# Patient Record
Sex: FEMALE | Race: WHITE | NOT HISPANIC OR LATINO | Employment: FULL TIME | URBAN - METROPOLITAN AREA
[De-identification: names, ages, dates, MRNs, and addresses within clinical notes are randomized per-mention and may not be internally consistent; named-entity substitution may affect disease eponyms.]

---

## 2019-03-12 ENCOUNTER — TELEPHONE (OUTPATIENT)
Dept: ONCOLOGY | Facility: CLINIC | Age: 59
End: 2019-03-12

## 2019-03-20 ENCOUNTER — HOSPITAL ENCOUNTER (OUTPATIENT)
Facility: CLINIC | Age: 59
Setting detail: SPECIMEN
End: 2019-03-20
Attending: COLON & RECTAL SURGERY
Payer: COMMERCIAL

## 2019-03-20 ENCOUNTER — HOSPITAL ENCOUNTER (OUTPATIENT)
Dept: LAB | Facility: CLINIC | Age: 59
End: 2019-03-20
Attending: COLON & RECTAL SURGERY
Payer: COMMERCIAL

## 2019-03-20 ENCOUNTER — ONCOLOGY VISIT (OUTPATIENT)
Dept: ONCOLOGY | Facility: CLINIC | Age: 59
End: 2019-03-20
Attending: COLON & RECTAL SURGERY
Payer: COMMERCIAL

## 2019-03-20 DIAGNOSIS — Z80.3 FAMILY HISTORY OF MALIGNANT NEOPLASM OF BREAST: Primary | ICD-10-CM

## 2019-03-20 DIAGNOSIS — Z80.41 FAMILY HISTORY OF MALIGNANT NEOPLASM OF OVARY: ICD-10-CM

## 2019-03-20 DIAGNOSIS — Z84.81 FAMILY HISTORY OF GENETIC DISEASE CARRIER: ICD-10-CM

## 2019-03-20 PROCEDURE — 40000072 ZZH STATISTIC GENETIC COUNSELING, < 16 MIN: Performed by: GENETIC COUNSELOR, MS

## 2019-03-20 NOTE — LETTER
Cancer Risk Management  Program Locations    [facility]  [facility]  [facility]  [facility]  Mailing Address  Cancer Risk Management Program  [facility]  [address] [facility] [address]  [place] [address]    New patient appointments  [phone]  March 20, 2019    Mari Rodriguez  [address] [address]      Dear Mari,    It was a pleasure meeting with you at the [facility] on 03/20/2019.  Here is a copy of the progress note from your recent genetic counseling visit to the Cancer Risk Management Program.  If you have any additional questions, please feel free to call.    Referring Provider: Self    Presenting Information:   I met with Mari Rodriguez today for genetic counseling at the Cancer Risk Management Program at the [facility] to discuss her sister's genetic testing results that were positive for a mutation in her MUTYH gene as well as her family history of breast, ovarian cancer. She is here today to review this history, cancer screening recommendations, and available genetic testing options.    Personal History:  Mari is a 58 year old female. She  does not have any personal history of cancer. Mari reports that her medical is significant for 3-4 ovarian cysts, one ectopic pregnancy, four miscarriages, and she was recently diagnosed with a hiatal and 3 stomach ulcers. She also reports that she was found to be a carrier of Factor V Leiden.        She had her first menstrual period at age 18, her first child at age 29, and started menopause around the age of 37.  Mari has her left ovary, left fallopian tube and uterus in place. She had her right ovary and fallopian tube removed due to her ectopic pregnancy. She gets annual  blood tests due to her mother's history of ovarian cancer.  She  reports a 1-2-year history of oral contraceptive use ages ~[age] and that she was on estrogen replacement therapy for a couple of years after she started menopause.     Her most recent OB-GYN exam and Pap smear in August of 2018 were normal.  She has annual clinical breast exams and mammograms. Her most recent mammogram in August of 2018 was normal. She has had 2 breast biopsies; one in her 40's and one in her early 50's.  She began having colonoscopies before the age of 52 due to GI issues. She has had 2-3 colonoscopies total. Mari reports that she has never had any polyps removed. Mari's most recent colonoscopy was in September of 2018 and was in conjunction with the endoscopy that discovered her ulcers. No polyps were removed from her colon. Mari follows regularly with dermatology for annual skin exams. She does not regularly do any other cancer screening at this time.  Mari reported no tobacco use, and occasional alcohol use.    Family History: (Please see scanned pedigree for detailed family history information)    Mari's sister, age 60, was recently diagnosed with breast cancer at age 60. This sister also has a history of basal cell carcinoma in her 40's. This sister recently completed genetic testing that identified a mutation in one copy of her MUTYH gene.    Mari's mother, age 88, has a history of ovarian cancer at age 75.     Mari's maternal uncle passed away at age 74 from lung cancer.    Mari's maternal uncle passed away at age 64 from lung cancer; he was a smoker. This uncle had a daughter who passed away at age 61 from lung cancer; she was a smoker.     Mari's maternal uncle passed away at age 89 with a history of Non-Hodgkin's lymphoma at age 45, colon cancer in his 80's, and lung cancer at age 85; he was a smoker.    Mari's maternal first-cousin passed away at age  54 with a history of testicular cancer at age 25, and bladder cancer at age 53.     Mari's maternal uncle passed away at age 55 with a history of  bladder cancer at age 54. This uncle has a daughter, age 58, who has a history of breast cancer at the age of 50.     Mari's maternal uncle passed away at age 71 with a history of lung cancer; he was a smoker.     aMri's maternal first-cousin passed away at age 71 due to lung cancer at age 71; she was a smoker.     Mari's father passed away at age 85 due to congestive heart failure. He had a history of a basal cell carcinoma at age 79.     Mari's paternal uncle passed away at age 74 with a history of a mouth cancer that spread to his nose at age 72. He was not a smoker.     Mari's paternal first-cousin has a history of breast cancer at age 60.     Her maternal ethnicity is [place]ese [place]. Her paternal ethnicity is Bhutanese and Lithuanian.  There is no known Ashkenazi Jew ancestry on either side of her family. There is no reported consanguinity.    Discussion:    Mari's personal and family history of ovarian cancer is suggestive of a hereditary cancer syndrome.    We reviewed the features of sporadic, familial, and hereditary cancers. In looking at Mari's family history, it is possible that a cancer susceptibility gene is present due to her mother's ovarian cancer.    We discussed the natural history and genetics of hereditary breast and gynecologic cancers.   We reviewed that the most common cause of hereditary breast cancer is Hereditary Breast and Ovarian Cancer (HBOC) syndrome, which is caused by mutations in the genes BRCA1 and BRCA2. BRCA1 and BRCA2 are two genes that increase the risk for breast and ovarian cancers, among others. Women who inherit a BRCA mutation have a 50 to 85% lifetime risk of breast cancer and a 15 to 45% lifetime risk of ovarian cancer. This is higher than the general population lifetime risks of 12% for breast cancer and less than 2% for ovarian cancer. Men with BRCA gene mutations have up to a 7% risk of breast cancer and 20% risk of prostate cancer. Other cancers, such as pancreatic cancer  and melanoma, have also been associated with BRCA mutations.  We dicussed her sister's genetic testing results. Mari's sister who was recently diagnosed with breast cancer completed OvaNext genetic testing through [facility]. This testing revealed that Mari's sister carries a mutation in one copy of her MUTYH gene. Specifically, this mutation is written p.Y179C (c.536A>G). MUTYH-Associated Polyposis (MAP) is caused by mutations in the MUTYH gene. Individuals with MAP typically have 10 to more than 100 adenoma type polyps in the colon and the gastrointestinal tract, as well as increased colon and duodenal cancer risk.   Everyone has two copies of the MUTYH gene. In order to be affected with MAP, a person must have a mutation in both copies of the gene.  Carriers of MAP (people who only have one mutation in one copy of the MUTYH gene) may have a very slightly increased risk of colon cancer.  We discussed that because Mari's sister is a carrier of MAP, there is a 50% change that Mari also inherited the same mutation in MUTYH and is also a carrier of MAP. We discussed that it is also possible that Mari could have mutations in both copies of her MUTYH gene if both of her parents were carriers. We discussed that if Mari is found to have a mutation in one or both copies of her MUTYH gene, genetic testing for her children would be recommended to determine their genetic status.     Based on her family history, Mari meets current National Comprehensive Cancer Network (NCCN) criteria for genetic testing of BRCA1 and BRCA2.      We discussed that there are additional genes that could cause increased risk for breast and gynecologic cancer. As many of these genes present with overlapping features in a family and accurate cancer risk cannot always be established based upon the pedigree analysis alone, it would be reasonable for Mari to consider panel genetic testing to analyze multiple genes at once.  Mari was present for her sister's  genetic counseling visit, and she expressed that she would like to proceed with the same genetic test that her sister completes. I believe this is appropriate given Mari's family history. Genetic testing is available for 25 genes associated with hereditary gynecologic, breast, and related cancers: OvaNext (MICA, BARD1, BRCA1, BRCA2, BRIP1, CDH1, CHEK2, DICER1, EPCAM, MLH1, MRE11A, MSH2, MSH6, MUTYH, NBN, NF1, PALB2, PMS2, PTEN, RAD50, RAD51C, RAD51D, SMARCA4, STK11, and TP53).  We discussed that some of the genes in the OvaNext panel are associated with specific hereditary cancer syndromes and published management guidelines: Hereditary Breast and Ovarian Cancer syndrome (BRCA1, BRCA2), Romero syndrome (MLH1, MSH2, MSH6, PMS2, EPCAM), Hereditary Diffuse Gastric Cancer (CDH1), Cowden syndrome (PTEN), Li Fraumeni syndrome (TP53), Peutz-Jeghers syndrome (STK11), MUTYH Associated Polyposis (MUTYH), and Neurofibromatosis type 1 (NF1).    Risk-reducing salpingo-oophorectomy can be considered in women with mutations in BRIP1, RAD51C, or RAD51D. Breast and/or other cancer risk management guidelines are available for MICA, CHEK2, PALB2, NF1, and NBN.  The remaining genes (BARD1, DICER1, MRE11A, RAD50, and SMARCA4) are associated with increased cancer risk and may allow us to make medical recommendations when mutations are identified.    Consent was obtained and genetic testing for OvaNext [facility]. Turn around time: approximately 5 weeks.    Medical Management: For Mari, we reviewed that the information from genetic testing may determine:    additional cancer screening for which Mari may qualify (i.e. mammogram and breast MRI, more frequent colonoscopies, more frequent dermatologic exams, etc.),    options for risk reducing surgeries Mari could consider (i.e. bilateral mastectomy, surgery to remove her ovaries and/or uterus, etc.),      and targeted chemotherapies for Mari's if she were to develop  certain cancers in the future (i.e. immunotherapy for individuals with Romero syndrome, PARP inhibitors, etc.).     These recommendations and possible targeted chemotherapies will be discussed in detail once genetic testing is completed.    A detailed handout regarding hereditary breast and gynecologic cancers and the information we discussed was provided to Mari at the end of our appointment today and can be found in the after visit summary. Topics included: inheritance pattern, cancer risks, cancer screening recommendations, and also risks, benefits and limitations of testing.     Plan:  1) Today Mari elected to proceed with OvaNext genetic testing (25 genes) through [facility].  2) This information should be available approximately 4 weeks after insurance verification.  3) Mari will be contacted by phone to set up a result disclosure appointment as soon as results are available.     Face to face time: 45 minutes    Kandace Wylie MS, [facility]  Genetic Counseling Intern  [phone]

## 2019-03-20 NOTE — LETTER
3/20/2019         RE: Mari Rodriguez  [address] [place] [address]        Dear Colleague,    Thank you for referring your patient, Mari Rodriguez, to the CANCER RISK MANAGEMENT PROGRAM. Please see a copy of my visit note below.    3/20/2019    Referring Provider: Self    Presenting Information:   I met with Mari Rodriguez today for genetic counseling at the Cancer Risk Management Program at the [facility] to discuss her sister's genetic testing results that were positive for a mutation in her MUTYH gene as well as her family history of breast, ovarian cancer. She is here today to review this history, cancer screening recommendations, and available genetic testing options.    Personal History:  Mari is a 58 year old female. She  does not have any personal history of cancer. Mari reports that her medical is significant for 3-4 ovarian cysts, one ectopic pregnancy, four miscarriages, and she was recently diagnosed with a hiatal and 3 stomach ulcers. She also reports that she was found to be a carrier of Factor V Leiden.        She had her first menstrual period at age 18, her first child at age 29, and started menopause around the age of 37.  Mari has her left ovary, left fallopian tube and uterus in place. She had her right ovary and fallopian tube removed due to her ectopic pregnancy. She gets annual  blood tests due to her mother's history of ovarian cancer.  She reports a 1-2-year history of oral contraceptive use ages ~[age] and that she was on estrogen replacement therapy for a couple of years after she started menopause.     Her most recent OB-GYN exam and Pap smear in August of 2018 were normal.  She has annual clinical breast exams and mammograms. Her most recent mammogram in August of 2018 was normal. She has had 2 breast biopsies; one in her 40's and one in her early 50's.  She began having colonoscopies before the age of 52 due to GI issues. She has had 2-3 colonoscopies  total. Mrai reports that she has never had any polyps removed. Mari's most recent colonoscopy was in September of 2018 and was in conjunction with the endoscopy that discovered her ulcers. No polyps were removed from her colon. Mari follows regularly with dermatology for annual skin exams. She does not regularly do any other cancer screening at this time.  Mari reported no tobacco use, and occasional alcohol use.    Family History: (Please see scanned pedigree for detailed family history information)    Mari's sister, age 60, was recently diagnosed with breast cancer at age 60. This sister also has a history of basal cell carcinoma in her 40's. This sister recently completed genetic testing that identified a mutation in one copy of her MUTYH gene.    Mari's mother, age 88, has a history of ovarian cancer at age 75.     Mari's maternal uncle passed away at age 74 from lung cancer.    Mari's maternal uncle passed away at age 64 from lung cancer; he was a smoker. This uncle had a daughter who passed away at age 61 from lung cancer; she was a smoker.     Mari's maternal uncle passed away at age 89 with a history of Non-Hodgkin's lymphoma at age 45, colon cancer in his 80's, and lung cancer at age 85; he was a smoker.    Mari's maternal first-cousin passed away at age  54 with a history of testicular cancer at age 25, and bladder cancer at age 53.     Mari's maternal uncle passed away at age 55 with a history of bladder cancer at age 54. This uncle has a daughter, age 58, who has a history of breast cancer at the age of 50.     Mari's maternal uncle passed away at age 71 with a history of lung cancer; he was a smoker.     Mari's maternal first-cousin passed away at age 71 due to lung cancer at age 71; she was a smoker.     Mari's father passed away at age 85 due to congestive heart failure. He had a history of a basal cell carcinoma at age 79.     Mari's paternal uncle passed away at age 74 with a history of a mouth cancer that spread  to his nose at age 72. He was not a smoker.     Mari's paternal first-cousin has a history of breast cancer at age 60.     Her maternal ethnicity is [place]. Her paternal ethnicity is British and [place].  There is no known Ashkenazi Confucianism ancestry on either side of her family. There is no reported consanguinity.    Discussion:    Mari's personal and family history of ovarian cancer is suggestive of a hereditary cancer syndrome.    We reviewed the features of sporadic, familial, and hereditary cancers. In looking at Mari's family history, it is possible that a cancer susceptibility gene is present due to her mother's ovarian cancer.    We discussed the natural history and genetics of hereditary breast and gynecologic cancers.   We reviewed that the most common cause of hereditary breast cancer is Hereditary Breast and Ovarian Cancer (HBOC) syndrome, which is caused by mutations in the genes BRCA1 and BRCA2. BRCA1 and BRCA2 are two genes that increase the risk for breast and ovarian cancers, among others. Women who inherit a BRCA mutation have a 50 to 85% lifetime risk of breast cancer and a 15 to 45% lifetime risk of ovarian cancer. This is higher than the general population lifetime risks of 12% for breast cancer and less than 2% for ovarian cancer. Men with BRCA gene mutations have up to a 7% risk of breast cancer and 20% risk of prostate cancer. Other cancers, such as pancreatic cancer and melanoma, have also been associated with BRCA mutations.  We dicussed her sister's genetic testing results. Mari's sister who was recently diagnosed with breast cancer completed OvaNext genetic testing through [facility]. This testing revealed that Mari's sister carries a mutation in one copy of her MUTYH gene. Specifically, this mutation is written p.Y179C (c.536A>G). MUTYH-Associated Polyposis (MAP) is caused by mutations in the MUTYH gene. Individuals with MAP typically have 10 to more than 100 adenoma type polyps  in the colon and the gastrointestinal tract, as well as increased colon and duodenal cancer risk.   Everyone has two copies of the MUTYH gene. In order to be affected with MAP, a person must have a mutation in both copies of the gene.  Carriers of MAP (people who only have one mutation in one copy of the MUTYH gene) may have a very slightly increased risk of colon cancer.  We discussed that because Mari's sister is a carrier of MAP, there is a 50% change that Mari also inherited the same mutation in MUTYH and is also a carrier of MAP. We discussed that it is also possible that Mari could have mutations in both copies of her MUTYH gene if both of her parents were carriers. We discussed that if Mari is found to have a mutation in one or both copies of her MUTYH gene, genetic testing for her children would be recommended to determine their genetic status.     Based on her family history, Mari meets current National Comprehensive Cancer Network (NCCN) criteria for genetic testing of BRCA1 and BRCA2.      We discussed that there are additional genes that could cause increased risk for breast and gynecologic cancer. As many of these genes present with overlapping features in a family and accurate cancer risk cannot always be established based upon the pedigree analysis alone, it would be reasonable for Mari to consider panel genetic testing to analyze multiple genes at once.  Mari was present for her sister's genetic counseling visit, and she expressed that she would like to proceed with the same genetic test that her sister completes. I believe this is appropriate given Mari's family history. Genetic testing is available for 25 genes associated with hereditary gynecologic, breast, and related cancers: OvaNext (MICA, BARD1, BRCA1, BRCA2, BRIP1, CDH1, CHEK2, DICER1, EPCAM, MLH1, MRE11A, MSH2, MSH6, MUTYH, NBN, NF1, PALB2, PMS2, PTEN, RAD50, RAD51C, RAD51D, SMARCA4, STK11, and TP53).  We discussed that some of the genes in the  OvaNext panel are associated with specific hereditary cancer syndromes and published management guidelines: Hereditary Breast and Ovarian Cancer syndrome (BRCA1, BRCA2), Romero syndrome (MLH1, MSH2, MSH6, PMS2, EPCAM), Hereditary Diffuse Gastric Cancer (CDH1), Cowden syndrome (PTEN), Li Fraumeni syndrome (TP53), Peutz-Jeghers syndrome (STK11), MUTYH Associated Polyposis (MUTYH), and Neurofibromatosis type 1 (NF1).    Risk-reducing salpingo-oophorectomy can be considered in women with mutations in BRIP1, RAD51C, or RAD51D. Breast and/or other cancer risk management guidelines are available for MICA, CHEK2, PALB2, NF1, and NBN.  The remaining genes (BARD1, DICER1, MRE11A, RAD50, and SMARCA4) are associated with increased cancer risk and may allow us to make medical recommendations when mutations are identified.    Consent was obtained and genetic testing for OvaNext was sent to [facility]. Turn around time: approximately 5 weeks.    Medical Management: For Mari, we reviewed that the information from genetic testing may determine:    additional cancer screening for which Mari may qualify (i.e. mammogram and breast MRI, more frequent colonoscopies, more frequent dermatologic exams, etc.),    options for risk reducing surgeries Mari could consider (i.e. bilateral mastectomy, surgery to remove her ovaries and/or uterus, etc.),      and targeted chemotherapies for Mari's if she were to develop certain cancers in the future (i.e. immunotherapy for individuals with Romero syndrome, PARP inhibitors, etc.).     These recommendations and possible targeted chemotherapies will be discussed in detail once genetic testing is completed.    A detailed handout regarding hereditary breast and gynecologic cancers and the information we discussed was provided to Mari at the end of our appointment today and can be found in the after visit summary. Topics included: inheritance pattern, cancer risks, cancer screening recommendations,  and also risks, benefits and limitations of testing.     Plan:  1) Today Mari elected to proceed with OvaNext genetic testing (25 genes) through [facility].  2) This information should be available approximately 4 weeks after insurance verification.  3) Mari will be contacted by phone to set up a result disclosure appointment as soon as results are available.     Face to face time: 45 minutes    Kandace Wylie MS, [facility]  Genetic Counseling Intern  [phone]            Again, thank you for allowing me to participate in the care of your patient.        Sincerely,        Kandace Redding, GC

## 2019-03-20 NOTE — PROGRESS NOTES
3/20/2019    Referring Provider: Self    Presenting Information:   I met with Mari Rodriguez today for genetic counseling at the Cancer Risk Management Program at the [facility] to discuss her sister's genetic testing results that were positive for a mutation in her MUTYH gene as well as her family history of breast, ovarian cancer. She is here today to review this history, cancer screening recommendations, and available genetic testing options.    Personal History:  Mari is a 58 year old female. She  does not have any personal history of cancer. Mari reports that her medical is significant for 3-4 ovarian cysts, one ectopic pregnancy, four miscarriages, and she was recently diagnosed with a hiatal and 3 stomach ulcers. She also reports that she was found to be a carrier of Factor V Leiden.        She had her first menstrual period at age 18, her first child at age 29, and started menopause around the age of 37.  Mari has her left ovary, left fallopian tube and uterus in place. She had her right ovary and fallopian tube removed due to her ectopic pregnancy. She gets annual  blood tests due to her mother's history of ovarian cancer.  She reports a 1-2-year history of oral contraceptive use ages ~[age] and that she was on estrogen replacement therapy for a couple of years after she started menopause.     Her most recent OB-GYN exam and Pap smear in August of 2018 were normal.  She has annual clinical breast exams and mammograms. Her most recent mammogram in August of 2018 was normal. She has had 2 breast biopsies; one in her 40's and one in her early 50's.  She began having colonoscopies before the age of 52 due to GI issues. She has had 2-3 colonoscopies total. Mari reports that she has never had any polyps removed. Mari's most recent colonoscopy was in September of 2018 and was in conjunction with the endoscopy that discovered her ulcers. No polyps were removed from her colon. Mari follows  regularly with dermatology for annual skin exams. She does not regularly do any other cancer screening at this time.  Mari reported no tobacco use, and occasional alcohol use.    Family History: (Please see scanned pedigree for detailed family history information)    Mari's sister, age 60, was recently diagnosed with breast cancer at age 60. This sister also has a history of basal cell carcinoma in her 40's. This sister recently completed genetic testing that identified a mutation in one copy of her MUTYH gene.    Mari's mother, age 88, has a history of ovarian cancer at age 75.     Mari's maternal uncle passed away at age 74 from lung cancer.    Mari's maternal uncle passed away at age 64 from lung cancer; he was a smoker. This uncle had a daughter who passed away at age 61 from lung cancer; she was a smoker.     Mari's maternal uncle passed away at age 89 with a history of Non-Hodgkin's lymphoma at age 45, colon cancer in his 80's, and lung cancer at age 85; he was a smoker.    Mari's maternal first-cousin passed away at age  54 with a history of testicular cancer at age 25, and bladder cancer at age 53.     Mari's maternal uncle passed away at age 55 with a history of bladder cancer at age 54. This uncle has a daughter, age 58, who has a history of breast cancer at the age of 50.     Mari's maternal uncle passed away at age 71 with a history of lung cancer; he was a smoker.     Mari's maternal first-cousin passed away at age 71 due to lung cancer at age 71; she was a smoker.     Mari's father passed away at age 85 due to congestive heart failure. He had a history of a basal cell carcinoma at age 79.     Mari's paternal uncle passed away at age 74 with a history of a mouth cancer that spread to his nose at age 72. He was not a smoker.     Mari's paternal first-cousin has a history of breast cancer at age 60.     Her maternal ethnicity is Frisian Citizen of [place]. Her paternal ethnicity is Israeli and [place].  There is no known  Ashkenazi Restorationist ancestry on either side of her family. There is no reported consanguinity.    Discussion:    Mari's personal and family history of ovarian cancer is suggestive of a hereditary cancer syndrome.    We reviewed the features of sporadic, familial, and hereditary cancers. In looking at Mari's family history, it is possible that a cancer susceptibility gene is present due to her mother's ovarian cancer.    We discussed the natural history and genetics of hereditary breast and gynecologic cancers.   We reviewed that the most common cause of hereditary breast cancer is Hereditary Breast and Ovarian Cancer (HBOC) syndrome, which is caused by mutations in the genes BRCA1 and BRCA2. BRCA1 and BRCA2 are two genes that increase the risk for breast and ovarian cancers, among others. Women who inherit a BRCA mutation have a 50 to 85% lifetime risk of breast cancer and a 15 to 45% lifetime risk of ovarian cancer. This is higher than the general population lifetime risks of 12% for breast cancer and less than 2% for ovarian cancer. Men with BRCA gene mutations have up to a 7% risk of breast cancer and 20% risk of prostate cancer. Other cancers, such as pancreatic cancer and melanoma, have also been associated with BRCA mutations.  We dicussed her sister's genetic testing results. Mari's sister who was recently diagnosed with breast cancer completed OvaNext genetic testing through [facility]. This testing revealed that Mari's sister carries a mutation in one copy of her MUTYH gene. Specifically, this mutation is written p.Y179C (c.536A>G). MUTYH-Associated Polyposis (MAP) is caused by mutations in the MUTYH gene. Individuals with MAP typically have 10 to more than 100 adenoma type polyps in the colon and the gastrointestinal tract, as well as increased colon and duodenal cancer risk.   Everyone has two copies of the MUTYH gene. In order to be affected with MAP, a person must have a mutation in both copies of the  gene.  Carriers of MAP (people who only have one mutation in one copy of the MUTYH gene) may have a very slightly increased risk of colon cancer.  We discussed that because Mari's sister is a carrier of MAP, there is a 50% change that Mari also inherited the same mutation in MUTYH and is also a carrier of MAP. We discussed that it is also possible that Mari could have mutations in both copies of her MUTYH gene if both of her parents were carriers. We discussed that if Mari is found to have a mutation in one or both copies of her MUTYH gene, genetic testing for her children would be recommended to determine their genetic status.     Based on her family history, Mari meets current National Comprehensive Cancer Network (NCCN) criteria for genetic testing of BRCA1 and BRCA2.      We discussed that there are additional genes that could cause increased risk for breast and gynecologic cancer. As many of these genes present with overlapping features in a family and accurate cancer risk cannot always be established based upon the pedigree analysis alone, it would be reasonable for Mari to consider panel genetic testing to analyze multiple genes at once.  Mari was present for her sister's genetic counseling visit, and she expressed that she would like to proceed with the same genetic test that her sister completes. I believe this is appropriate given Mari's family history. Genetic testing is available for 25 genes associated with hereditary gynecologic, breast, and related cancers: OvaNext (MICA, BARD1, BRCA1, BRCA2, BRIP1, CDH1, CHEK2, DICER1, EPCAM, MLH1, MRE11A, MSH2, MSH6, MUTYH, NBN, NF1, PALB2, PMS2, PTEN, RAD50, RAD51C, RAD51D, SMARCA4, STK11, and TP53).  We discussed that some of the genes in the OvaNext panel are associated with specific hereditary cancer syndromes and published management guidelines: Hereditary Breast and Ovarian Cancer syndrome (BRCA1, BRCA2), Romero syndrome (MLH1, MSH2, MSH6, PMS2, EPCAM), Hereditary  Diffuse Gastric Cancer (CDH1), Cowden syndrome (PTEN), Li Fraumeni syndrome (TP53), Peutz-Jeghers syndrome (STK11), MUTYH Associated Polyposis (MUTYH), and Neurofibromatosis type 1 (NF1).    Risk-reducing salpingo-oophorectomy can be considered in women with mutations in BRIP1, RAD51C, or RAD51D. Breast and/or other cancer risk management guidelines are available for MICA, CHEK2, PALB2, NF1, and NBN.  The remaining genes (BARD1, DICER1, MRE11A, RAD50, and SMARCA4) are associated with increased cancer risk and may allow us to make medical recommendations when mutations are identified.    Consent was obtained and genetic testing for OvaNext was sent to [facility]. Turn around time: approximately 5 weeks.    Medical Management: For Mari, we reviewed that the information from genetic testing may determine:    additional cancer screening for which Mari may qualify (i.e. mammogram and breast MRI, more frequent colonoscopies, more frequent dermatologic exams, etc.),    options for risk reducing surgeries Mari could consider (i.e. bilateral mastectomy, surgery to remove her ovaries and/or uterus, etc.),      and targeted chemotherapies for Mari's if she were to develop certain cancers in the future (i.e. immunotherapy for individuals with Romero syndrome, PARP inhibitors, etc.).     These recommendations and possible targeted chemotherapies will be discussed in detail once genetic testing is completed.    A detailed handout regarding hereditary breast and gynecologic cancers and the information we discussed was provided to Mari at the end of our appointment today and can be found in the after visit summary. Topics included: inheritance pattern, cancer risks, cancer screening recommendations, and also risks, benefits and limitations of testing.     Plan:  1) Today Mari elected to proceed with OvaNext genetic testing (25 genes) through [facility].  2) This information should be available approximately 4 weeks after  insurance verification.  3) Mari will be contacted by phone to set up a result disclosure appointment as soon as results are available.     Face to face time: 45 minutes    Kandace Wylie MS, [facility]  Genetic Counseling Intern  [phone]    Attestation: Patient seen, evaluated and discussed with the Genetic Counseling Intern. I have verified the content of the note, which accurately reflects my assessment of the patient and the plan of care.    Iveth Melara MS, [facility]  Supervising Genetic Counselor

## 2019-03-20 NOTE — PATIENT INSTRUCTIONS
Assessing Cancer Risk  Only about 5-10% of cancers are thought to be due to an inherited cancer susceptibility gene.    These families often have:    Several people with the same or related types of cancer    Cancers diagnosed at a young age (before age 50)    Individuals with more than one primary cancer    Multiple generations of the family affected with cancer    Some people may be candidates for genetic testing of more than one gene.  For these families, genetic testing using a cancer panel may be offered.  These panels will test different genes known to increase the risk for breast, ovarian, uterine, and/or other cancers. All of the genes discussed below have published clinical management guidelines for individuals who are found to carry a mutation. The purpose of this handout is to serve as a brief summary of the genes analyzed by the panels used to inquire about hereditary breast and gynecologic cancer:  MICA, BRCA1, BRCA2, BRIP1, CDH1, CHEK2, MLH1, MSH2, MSH6, PMS2, EPCAM, PTEN, PALB2, RAD51C, RAD51D, and TP53.  ______________________________________________________________________________  Hereditary Breast and Ovarian Cancer Syndrome   (BRCA1 and BRCA2)  A single mutation in one of the copies of BRCA1 or BRCA2 increases the risk for breast and ovarian cancer, among others.  The risk for pancreatic cancer and melanoma may also be slightly increased in some families.  The chart below shows the chance that someone with a BRCA mutation would develop cancer in his or her lifetime1,2,3,4.        A person s ethnic background is also important to consider, as individuals of Ashkenazi Congregational ancestry have a higher chance of having a BRCA gene mutation.  There are three BRCA mutations that occur more frequently in this population.    Romero Syndrome   (MLH1, MSH2, MSH6, PMS2, and EPCAM)  Currently five genes are known to cause Romero Syndrome: MLH1, MSH2, MSH6, PMS2, and EPCAM.  A single mutation in one of the  Romero Syndrome genes increases the risk for colon, endometrial, ovarian, and stomach cancers.  Other cancers that occur less commonly in Romero Syndrome include urinary tract, skin, and brain cancers.  The chart below shows the chance that a person with Romero syndrome would develop cancer in his or her lifetime5.      *Cancer risk varies depending on Romero syndrome gene found    Cowden Syndrome   (PTEN)  Cowden syndrome is a hereditary condition that increases the risk for breast, thyroid, endometrial, colon, and kidney cancer.  Cowden syndrome is caused by a mutation in the PTEN gene.  A single mutation in one of the copies of PTEN causes Cowden syndrome and increases cancer risk.  The chart below shows the chance that someone with a PTEN mutation would develop cancer in their lifetime6,7.  Other benign features seen in some individuals with Cowden syndrome include benign skin lesions (facial papules, keratoses, lipomas), learning disability, autism, thyroid nodules, colon polyps, and larger head size.      *One recent study found breast cancer risk to be increased to 85%    Li-Fraumeni Syndrome   (TP53)  Li-Fraumeni Syndrome (LFS) is a cancer predisposition syndrome caused by a mutation in the TP53 gene. A single mutation in one of the copies of TP53 increases the risk for multiple cancers. Individuals with LFS are at an increased risk for developing cancer at a young age. The lifetime risk for development of a LFS-associated cancer is 50% by age 30 and 90% by age 60.   Core Cancers: Sarcomas, Breast, Brain, Lung, Leukemias/Lymphomas, Adrenocortical carcinomas  Other Cancers: Gastrointestinal, Thyroid, Skin, Genitourinary    Hereditary Diffuse Gastric Cancer   (CDH1)  Currently, one gene is known to cause hereditary diffuse gastric cancer (HDGC): CDH1.  Individuals with HDGC are at increased risk for diffuse gastric cancer and lobular breast cancer. Of people diagnosed with HDGC, 30-50% have a mutation in the CDH1  gene.  This suggests there are likely other genes that may cause HDGC that have not been identified yet.      Lifetime Cancer Risks    General Population HDGC    Diffuse Gastric  <1% ~80%   Breast 12% 39-52%         Additional Genes  MICA  MICA is a moderate-risk breast cancer gene. Women who have a mutation in MICA can have between a 2-4 fold increased risk for breast cancer compared to the general population8. MICA mutations have also been associated with increased risk for pancreatic cancer, however an estimate of this cancer risk is not well understood9. Individuals who inherit two MICA mutations have a condition called ataxia-telangiectasia (AT).  This rare autosomal recessive condition affects the nervous system and immune system, and is associated with progressive cerebellar ataxia beginning in childhood.  Individuals with ataxia-telangiectasia often have a weakened immune system and have an increased risk for childhood cancers.    PALB2  Mutations in PALB2 have been shown to increase the risk of breast cancer up to 33-58% in some families; where individuals fall within this risk range is dependent upon family [id number]. PALB2 mutations have also been associated with increased risk for pancreatic cancer, although this risk has not been quantified yet.  Individuals who inherit two PALB2 mutations--one from their mother and one from their father--have a condition called Fanconi Anemia.  This rare autosomal recessive condition is associated with short stature, developmental delay, bone marrow failure, and increased risk for childhood cancers.    CHEK2   CHEK2 is a moderate-risk breast cancer gene.  Women who have a mutation in CHEK2 have around a 2-fold increased risk for breast cancer compared to the general population, and this risk may be higher depending upon family history.11,12,13 Mutations in CHEK2 have also been shown to increase the risk of a number of other cancers, including colon and prostate, however  these cancer risks are currently not well understood.    BRIP1, RAD51C and RAD51D  Mutations in BRIP1, RAD51C, and RAD51D have been shown to increase the risk of ovarian cancer and possibly female breast cancer as well14,15 .       Lifetime Cancer Risk    General Population BRIP1 RAD51C RAD51D   Ovarian 1-2% ~5-8% ~5-9% ~7-15%           Inheritance  All of the cancer syndromes reviewed above are inherited in an autosomal dominant pattern.  This means that if a parent has a mutation, each of his or her children will have a 50% chance of inheriting that same mutation.  Therefore, each child--male or female--would have a 50% chance of being at increased risk for developing cancer.      Image obtained from Genetics Home Reference, 2013     Mutations in some genes can occur de thor, which means that a person s mutation occurred for the first time in them and was not inherited from a parent.  Now that they have the mutation, however, it can be passed on to future generations.    Genetic Testing  Genetic testing involves a blood test and will look at the genetic information in the MICA, BRCA1, BRCA2, BRIP1, CDH1, CHEK2, MLH1, MSH2, MSH6, PMS2, EPCAM, PTEN, PALB2, RAD51C, RAD51D, and TP53 genes for any harmful mutations that are associated with increased cancer risk.  If possible, it is recommended that the person(s) who has had cancer be tested before other family members.  That person will give us the most useful information about whether or not a specific gene is associated with the cancer in the family.    Results  There are three possible results of genetic testing:    Positive--a harmful mutation was identified in one or more of the genes    Negative--no mutation was identified in any of the genes on this panel    Variant of unknown significance--a variation in one of the genes was identified, but it is unclear how this impacts cancer risk in the family    Advantages and Disadvantages   There are advantages and  disadvantages to genetic testing.    Advantages    May clarify your cancer risk    Can help you make medical decisions    May explain the cancers in your family    May give useful information to your family members (if you share your results)    Disadvantages    Possible negative emotional impact of learning about inherited cancer risk    Uncertainty in interpreting a negative test result in some situations    Possible genetic discrimination concerns (see below)    Genetic Information Nondiscrimination Act (ROSAS)  ROSAS is a federal law that protects individuals from health insurance or employment discrimination based on a genetic test result alone.  Although rare, there are currently no legal discrimination protections in terms of life insurance, long term care, or disability insurances.  Visit the [employer] [place] website to learn more.    Reducing Cancer Risk  All of the genes described above have nationally recognized cancer screening guidelines that would be recommended for individuals who test positive.  In addition to increased cancer screening, surgeries may be offered or recommended to reduce cancer risk.  Recommendations are based upon an individual s genetic test result as well as their personal and family history of cancer.    Questions to Think About Regarding Genetic Testing:    What effect will the test result have on me and my relationship with my family members if I have an inherited gene mutation?  If I don t have a gene mutation?    Should I share my test results, and how will my family react to this news, which may also affect them?    Are my children ready to learn new information that may one day affect their own health?    Hereditary Cancer Resources    FORCE: Facing Our Risk of Cancer Empowered facingourrisk.org   Bright Pink bebrightpink.org   Li-Fraumeni Syndrome Association lfsassociation.org   PTEN World PTENworld.com   No stomach for cancer, Inc.  nostomachforcancer.org   Stomach cancer relief network Scrnet.org   Collaborative Group of the Americas on Inherited Colorectal Cancer (CGA) cgaicc.com    Cancer Care cancercare.org   American Cancer Society (ACS) cancer.org   National Cancer [place] (NCI) cancer.gov     Please call us if you have any questions or concerns.   Cancer Risk Management Program [phone] ([phone])  ? Venita Mayen, MS, [facility]  [phone]  ? Iveth Melara, MS, [facility]  [phone]  ? Aracelis Solitario, MS, [facility]  [phone]  ? Reina Blackman, MS, [facility]  [phone]  ? Shama Soren, MS, [facility] [phone]  ? Kandace Wylie, MS, [facility]            [phone]    References  1. Ariela JACOBO, Brian PDP, Curt S, Brisa DUNN, Bella JE, Juan M JL, Danny N, Vilma H, Paul O, Jose G A, Morenita B, Gustavo P, Manmoy S, Jayjay DM, Dias N, Stacie E, Reji H, Dat E, Mackenzie J, Gronisaak J, Romel B, Adrien H, Thorlacius S, Eerola H, Charlie H, Amanda K, Alessia OP. Average risks of breast and ovarian cancer associated with BRCA1 or BRCA2 mutations detected in case series unselected for family history: a combined analysis of 222 studies. Am J Hum [place]ima. 2003;72:1117-30.  2. Prashant REYES, Cindy M, Rajni G.  BRCA1 and BRCA2 Hereditary Breast and Ovarian Cancer. Gene Reviews online. 2013.  3. Josue YC, Joanie S, Kimi G, Mancini S. Breast cancer risk among male BRCA1 and BRCA2 mutation carriers. J Natl Cancer Inst. 2007;99:1811-4.  4. Alonzo STRICKLAND, Denis I, Ian J, Ezio E, Rosa ER, Shireen F. Risk of breast cancer in male BRCA2 carriers. J Med [place]. 2010;47:710-1.  5. National Comprehensive Cancer Network. Clinical practice guidelines in oncology, colorectal cancer screening. Available online (registration required). 2015.  6. Jean CONTRERAS, Shanita J, Germán J, Sandy LA, Amber RANKIN, Rachid C. Lifetime cancer risks in individuals with germline PTEN mutations. Clin Cancer Res. 2012;18:400-7.  7. Rhett RIGGS. Cowden Syndrome: A Critical Review  of the Clinical Literature. J [place]ima . 2009:18:13-27.  8. Luci JACOBO, Ricardo D, Sandra S, Zee P, Arnaldo T, Judson M, Umang B, Marley H, Caitlin R, Minor K, Alycia L, Alonzo DG, Jayjay D, Angel DF, Akira MR, The Breast Cancer Susceptibility Collaboration (UK) & Kenyetta REYES. MICA mutations that cause ataxia-telangiectasia are breast cancer susceptibility alleles. Nature Genetics. 2006;38:873-875  9. Av N , Ulysses Y, Tonia J, Jorden L, Suha GM , Tamar ML, Gallinger S, Moseley AG, Syngal S, Juanita ML, Navin J , Rob R, Brenden SZ, Gisela JR, Mark VE, Lm M, Vojenny B, Meaghan N, Maurilio RH, Lupe KW, and Claudia AP. MICA mutations in patients with hereditary pancreatic cancer. Cancer Discover. 2012;2:41-46  10. Ariela JIMENEZ, et al. Breast-Cancer Risk in Families with Mutations in PALB2. NEJM. 2014; 371(6):497-506.  11. CHEK2 Breast Cancer Case-Control Consortium. CHEK2*1100delC and susceptibility to breast cancer: A collaborative analysis involving 10,860 breast cancer cases and 9,065 controls from 10 studies. Am J Hum [place], 74 (2004), pp. 4099-9929  12. Roberto T, Tahira S, La K, et al. Spectrum of Mutations in BRCA1, BRCA2, CHEK2, and TP53 in Families at High Risk of Breast Cancer. GEMA. 2006;295(12):5800-0522.   13. Ambreen C, Prakash D, Nisa A, et al. Risk of breast cancer in women with a CHEK2 mutation with and without a family history of breast cancer. J Clin Oncol. 2011;29:6461-4785.  14. Emmanuel H, Tracy E, Ang SJ, et al. Contribution of germline mutations in the RAD51B, RAD51C, and RAD51D genes to ovarian cancer in the population. J Clin Oncol. 2015;33(26):4774-5418. Doi:10.1200/JCO.2015.61.2408.  15. Inder T, Evelyne MCKENNA, Heather P, et al. Mutations in BRIP1 confer high risk of ovarian cancer. Clara [place]ima. 2011;43(11):4461-9917. doi:10.1038/ng.955.

## 2019-03-22 LAB — MISCELLANEOUS TEST: NORMAL

## 2019-04-16 ENCOUNTER — OFFICE VISIT - HEALTHEAST (OUTPATIENT)
Dept: ONCOLOGY | Facility: HOSPITAL | Age: 59
End: 2019-04-16

## 2019-04-16 DIAGNOSIS — Z80.3 FAMILY HISTORY OF BREAST CANCER: ICD-10-CM

## 2019-04-16 DIAGNOSIS — Z80.41 FAMILY HISTORY OF OVARIAN CANCER: ICD-10-CM

## 2019-04-19 LAB — LAB SCANNED RESULT: NORMAL

## 2019-09-18 ENCOUNTER — RECORDS - HEALTHEAST (OUTPATIENT)
Dept: ADMINISTRATIVE | Facility: OTHER | Age: 59
End: 2019-09-18

## 2021-05-27 NOTE — PROGRESS NOTES
4/16/2019    Referring Provider: Self-Referred    Presenting Information:   Mari returned to the [facility] at [facility] to discuss her genetic testing results. Her blood was drawn on 03/21/2019 and we ordered OvaNext testing from [facility]. This testing was done because of her family history of ovarian cancer and her sister testing positive for a mutation in the MUTYH gene.     Genetic Testing Results: POSITIVE - CARRIER  Mari is POSITIVE for one MUTYH mutation, meaning that she is a CARRIER for MUTYH-Associated Polyposis (MAP). Specifically her mutation is called p.Y179C (c.536A>G). We discussed that this mutation is associated with a slightly increased risk for colon cancer. No other mutations were found in the MUTYH gene.    Mari is negative for mutations in the MICA, BARD1, BRCA1, BRCA2, BRIP1, CDH1, CHEK2, DICER1, EPCAM, MLH1, MRE11A, MSH2, MSH6, NBN, NF1, PALB2, PMS2, PTEN, RAD50, RAD51C, RAD51D, SMARCA4, STK11, and TP53 genes. No mutations were found in any of the other 24 genes analyzed. This test involved sequencing and deletion/duplication analysis of all genes with the exceptions of EPCAM (deletions/duplications only).    Testing did not detect an identifiable mutation associated with Hereditary Breast and Ovarian Cancer syndrome (BRCA1, BRCA2), Romero syndrome (MLH1, MSH2, MSH6, PMS2, EPCAM), Hereditary Diffuse Gastric Cancer (CDH1), Cowden syndrome (PTEN), Li Fraumeni syndrome (TP53), Peutz-Jeghers syndrome (STK11), or Neurofibromatosis type 1 (NF1). We reviewed the autosomal dominant inheritance of these genes. Mari cannot pass on a mutation in any of these genes to her children based on this test result.  Mutations in these genes do not skip generations.      Cancer Risks:   We reviewed that having only one MUTYH mutation means that an individual is a carrier for MAP. Approximately 1 in 100 (1%) individuals in the general population are carriers of MAP (carry a single  mutation in the MUTYH gene).      Carriers of MAP have a very slightly increased risk of colon cancer (which could be as high as twice the population risk of 5-6%).     Several studies have also suggested that women who carry one MUTYH mutation may be at some increased risk for breast cancer, though additional research is needed to clarify this potential risk.    We discussed that in comparison, individuals with MAP (who have two MUTYH mutations) can have hundreds to thousands of polyps, but most have fewer than 100. Individuals with MAP have up to an 80% risk of developing colon cancer by age 70 if not treated. MAP is also associated with an increased risk of duodenal cancer.      Cancer Screening and Prevention:  The following screening is recommended for individuals who have one mutation in the MUTYH gene, per current National Comprehensive Cancer Network (NCCN) guidelines:    Individuals who have a first degree relative (parent, child, sibling) with colon cancer should consider have a colonoscopy every five years starting at 40 (or 10 years before their relative's age of diagnosis, whichever is first).    It is uncertain if a specialized screening plan is necessary for those individuals who only have a second degree relative with colon cancer or do not have a family history of colon cancer.  We discussed the importance of keeping in contact with me, at least annually, to determine if guidelines have changed, as this may change how we approach colon screening for her in the future.     There are currently no other specific screening recommendations for other cancers potentially associated with one MUTYH mutation.    Other screening based on family history:    Based on her personal and family history, Mari has a 11% lifetime risk of developing breast cancer based on the ROSELIA (v8) model.  Therefore, Mari does not meet current National Comprehensive Cancer Network (NCCN) guidelines for high risk breast screening,  which is offered to women with a 20% lifetime risk or higher. However, it is still important for Mari to continue with routine breast screening under the care of her physicians. Breast cancer screening is generally recommended to begin approximately 10 years younger than the earliest age of breast cancer diagnosis in the family, or at age 40, whichever comes first.  In this family, screening may begin at age 40.  Mari is encouraged to discuss breast screening with her physicians.     Due to Mari's family history of ovarian cancer, Mari and other close female relatives of the family member who had ovarian cancer (Mari's mother) remain at slightly increased risk for ovarian cancer. We discussed available ovarian cancer screening (pelvic exams, CA-125 blood tests, and transvaginal ultrasounds) as well as the significant limitations of this screening. As such, this screening is not typically recommended. That being said, women in this family should discuss this screening and the signs and symptoms of ovarian cancer with their primary OB/GYN provider, as they may have individualized recommendations.    Other population cancer screening options, such as those recommended by the American Cancer Society and the National Comprehensive Cancer Network (NCCN), are also appropriate for Mari and her family. These screening recommendations may change if there are changes to Mari's personal and/or family history. Final screening recommendations should be made by each individual's managing physician.    Implications for Family Members:  We reviewed the autosomal recessive inheritance of MAP. Individuals with MAP have a mutation within both copies of the MUTYH gene (two mutations, one that was inherited from each parent). When both parents are carriers for MAP, they have a 25% chance of having a child who inherited two mutations (affected with MAP), a 50% chance of having a child who inherited one mutation (carrier of MAP, with small  "increased risk for colon cancer), and a 25% chance of having a child who inherited neither mutation (unaffected; not a carrier). These chances apply to each pregnancy.     We discussed that testing for MUTYH mutations in Mari s  is available, which would be helpful in determining risk for their children.  If Mari s  is a carrier, the above risks apply.  If he is not, their children would, at most, be carriers, and would just need to be tested for the mutation found in Mari. If Mari s  chooses not to have testing, her children should have full gene sequencing and deletion/duplication analysis of the MUTYH gene to best clarify their risk.     Genetic counseling is also recommended for Mari s siblings, as they have at least a 50% chance to carry the same MUTYH mutation identified in Mari. It will likely be recommended that they consider comprehensive testing of the MUTYH gene, though, as it is possible that they could have MAP or carry a mutation in the MUTYH gene that is different than the one identified in Mari. I would be happy to see Mari s family members for testing.     Additional Testing Considerations:  Even though Mari's genetic testing result was positive, other relatives may carry a different gene mutation associated with the cancer's in her family. Genetic counseling is recommended for Mari's siblings to discuss genetic testing options. If any of these relatives do pursue genetic testing, Mari is encouraged to contact me so that we may review the impact of their test results on Mari.    Plan:  1.  I provided Mari with a copy of her test results and support resources today.  2.  I will provide a \"Dear Relative\" letter for Mari to share with her family members.  3.  She plans to follow up with her primary care providers.    If Mari has additional questions, I encouraged her to contact me directly at [phone].     Time spent face to face: 15 minutes    Kandace Wylie MS, [facility]  Licensed Genetic " Counselor  [facility]  [phone]

## 2021-06-19 NOTE — LETTER
Letter by Kandace Wylie, Genetic Counselor at      Author: Kandace Wylie, Genetic Counselor Service: -- Author Type: --    Filed:  Encounter Date: 4/16/2019 Status: (Other)       4/16/2019    Dear Relative:  The purpose of this letter is to inform you that your relative recently underwent genetic counseling and genetic testing due to the family history of cancer.    The testing done through [facility], and identified one mutation in the MUTYH gene. Specifically, the mutation is called c.536A>G (p.Y179C).  The accession number linked to your relative's test report is [id number].  Mutations in MUTYH cause a condition called MAP (MUTYH associated polyposis).  Having just one mutation in MUTYH means you are a carrier, and have a slightly increased risk for colon cancer.  Having two mutations in MUTYH means you have MAP.    Individuals with MAP can have hundreds to thousands of colon polyps, although most have fewer than 100 adenomatous polyps.  Individuals with MAP have up to an 80% risk of developing colon cancer by age 70 if not treated.  MAP is also associated with an increased risk of duodenal cancer.  Several other benign growths have been reported in MAP.  Carriers of MAP have a very slight increased risk of colon cancer (which could be as high as twice the population risk of about 5%). Approximately 1 in 100 (1%) individuals in the general population are carriers of MAP.  The risk to pass a MUTYH mutation on to children depends on if a parent has one or two MUTYH mutation(s).  The risks are different if one or both parents are carriers, not carriers, or have MAP themselves.  I understand that this may be surprising, unexpected, and even scary news.  Because this mutation has been identified in your family, you are at risk for having this same mutation (being a carrier for MAP), or having two mutations (having MAP due to this mutation and another mutation).  As mentioned earlier, your children may also be  at risk.    Genetic testing for mutations in the MUTYH gene can be done to determine your risk.  Depending on how you are related to other individuals in the family who have already undergone genetic testing, more targeted testing may be appropriate.     Scheduling a genetic counseling appointment does not mean you have to undergo genetic testing.  The decision to pursue such testing is a very personal one that is discussed in more detail during the session.  Much of cancer genetic counseling is providing valuable information to individuals who are impacted by genetic information such as this.    If you are interested in scheduling a genetic counseling appointment at [facility], please call [phone] to schedule an appointment. You can also find a genetic counselor close to you or at another health system at [facility]  Sincerely,   Kandace Wylie MS, [facility]  Licensed Genetic Counselor  [facility]  [phone]

## 2021-06-19 NOTE — LETTER
Letter by Kandace Wylie, Genetic Counselor at      Author: Kandace Wylie, Genetic Counselor Service: -- Author Type: --    Filed:  Encounter Date: 4/16/2019 Status: (Other)         Mari Rodriguez  [address] [address]      April 16, 2019      Dear MsBeth Kano,    It was a pleasure meeting with you at the [facility] on 04/16/2019.  Here is a copy of the progress note from your recent genetic counseling visit.  If you have any additional questions, please feel free to call.    4/16/2019    Referring Provider: Self-Referred    Presenting Information:   Mari returned to the [facility] at [facility] to discuss her genetic testing results. Her blood was drawn on 03/21/2019 and we ordered OvaNext testing from [facility]. This testing was done because of her family history of ovarian cancer and her sister testing positive for a mutation in the MUTYH gene.     Genetic Testing Results: POSITIVE - CARRIER  Mari is POSITIVE for one MUTYH mutation, meaning that she is a CARRIER for MUTYH-Associated Polyposis (MAP). Specifically her mutation is called p.Y179C (c.536A>G). We discussed that this mutation is associated with a slightly increased risk for colon cancer. No other mutations were found in the MUTYH gene.    Mari is negative for mutations in the MICA, BARD1, BRCA1, BRCA2, BRIP1, CDH1, CHEK2, DICER1, EPCAM, MLH1, MRE11A, MSH2, MSH6, NBN, NF1, PALB2, PMS2, PTEN, RAD50, RAD51C, RAD51D, SMARCA4, STK11, and TP53 genes. No mutations were found in any of the other 24 genes analyzed. This test involved sequencing and deletion/duplication analysis of all genes with the exceptions of EPCAM (deletions/duplications only).    Testing did not detect an identifiable mutation associated with Hereditary Breast and Ovarian Cancer syndrome (BRCA1, BRCA2), Romero syndrome (MLH1, MSH2, MSH6, PMS2, EPCAM), Hereditary Diffuse Gastric Cancer (CDH1), Cowden syndrome (PTEN), Li Fraumeni  syndrome (TP53), Peutz-Jeghers syndrome (STK11), or Neurofibromatosis type 1 (NF1). We reviewed the autosomal dominant inheritance of these genes. Mari cannot pass on a mutation in any of these genes to her children based on this test result.  Mutations in these genes do not skip generations.      Cancer Risks:   We reviewed that having only one MUTYH mutation means that an individual is a carrier for MAP. Approximately 1 in 100 (1%) individuals in the general population are carriers of MAP (carry a single mutation in the MUTYH gene).      Carriers of MAP have a very slightly increased risk of colon cancer (which could be as high as twice the population risk of 5-6%).     Several studies have also suggested that women who carry one MUTYH mutation may be at some increased risk for breast cancer, though additional research is needed to clarify this potential risk.    We discussed that in comparison, individuals with MAP (who have two MUTYH mutations) can have hundreds to thousands of polyps, but most have fewer than 100. Individuals with MAP have up to an 80% risk of developing colon cancer by age 70 if not treated. MAP is also associated with an increased risk of duodenal cancer.      Cancer Screening and Prevention:  The following screening is recommended for individuals who have one mutation in the MUTYH gene, per current National Comprehensive Cancer Network (NCCN) guidelines:    Individuals who have a first degree relative (parent, child, sibling) with colon cancer should consider have a colonoscopy every five years starting at 40 (or 10 years before their relative's age of diagnosis, whichever is first).    It is uncertain if a specialized screening plan is necessary for those individuals who only have a second degree relative with colon cancer or do not have a family history of colon cancer.  We discussed the importance of keeping in contact with me, at least annually, to determine if guidelines have changed, as  this may change how we approach colon screening for her in the future.     There are currently no other specific screening recommendations for other cancers potentially associated with one MUTYH mutation.    Other screening based on family history:    Based on her personal and family history, Mari has a 11% lifetime risk of developing breast cancer based on the ROSELIA (v8) model.  Therefore, Mari does not meet current National Comprehensive Cancer Network (NCCN) guidelines for high risk breast screening, which is offered to women with a 20% lifetime risk or higher. However, it is still important for Mari to continue with routine breast screening under the care of her physicians. Breast cancer screening is generally recommended to begin approximately 10 years younger than the earliest age of breast cancer diagnosis in the family, or at age 40, whichever comes first.  In this family, screening may begin at age 40.  Mari is encouraged to discuss breast screening with her physicians.     Due to Mari's family history of ovarian cancer, Mari and other close female relatives of the family member who had ovarian cancer (Mari's mother) remain at slightly increased risk for ovarian cancer. We discussed available ovarian cancer screening (pelvic exams, CA-125 blood tests, and transvaginal ultrasounds) as well as the significant limitations of this screening. As such, this screening is not typically recommended. That being said, women in this family should discuss this screening and the signs and symptoms of ovarian cancer with their primary OB/GYN provider, as they may have individualized recommendations.    Other population cancer screening options, such as those recommended by the American Cancer Society and the National Comprehensive Cancer Network (NCCN), are also appropriate for Mari and her family. These screening recommendations may change if there are changes to Mari's personal and/or family history. Final screening  recommendations should be made by each individual's managing physician.    Implications for Family Members:  We reviewed the autosomal recessive inheritance of MAP. Individuals with MAP have a mutation within both copies of the MUTYH gene (two mutations, one that was inherited from each parent). When both parents are carriers for MAP, they have a 25% chance of having a child who inherited two mutations (affected with MAP), a 50% chance of having a child who inherited one mutation (carrier of MAP, with small increased risk for colon cancer), and a 25% chance of having a child who inherited neither mutation (unaffected; not a carrier). These chances apply to each pregnancy.     We discussed that testing for MUTYH mutations in Francoise  is available, which would be helpful in determining risk for their children.  If Francoise  is a carrier, the above risks apply.  If he is not, their children would, at most, be carriers, and would just need to be tested for the mutation found in Mari. If Francoise  chooses not to have testing, her children should have full gene sequencing and deletion/duplication analysis of the MUTYH gene to best clarify their risk.     Genetic counseling is also recommended for Francoise siblings, as they have at least a 50% chance to carry the same MUTYH mutation identified in Mari. It will likely be recommended that they consider comprehensive testing of the MUTYH gene, though, as it is possible that they could have MAP or carry a mutation in the MUTYH gene that is different than the one identified in Mari. I would be happy to see Francoise family members for testing.     Additional Testing Considerations:  Even though Mari's genetic testing result was positive, other relatives may carry a different gene mutation associated with the cancer's in her family. Genetic counseling is recommended for Mari's siblings to discuss genetic testing options. If any of these relatives do pursue genetic testing, Mari is  "encouraged to contact me so that we may review the impact of their test results on Mari.    Plan:  1.  I provided Mari with a copy of her test results and support resources today.  2.  I will provide a \"Dear Relative\" letter for Mari to share with her family members.  3.  She plans to follow up with her primary care providers.    If Mari has additional questions, I encouraged her to contact me directly at [phone].       Sincerely,       Kandace Wylie MS, [facility]  Licensed Genetic Counselor  [facility]  [phone]               "

## 2022-08-26 ENCOUNTER — LAB REQUISITION (OUTPATIENT)
Dept: LAB | Facility: CLINIC | Age: 62
End: 2022-08-26

## 2022-08-26 DIAGNOSIS — Z12.4 ENCOUNTER FOR SCREENING FOR MALIGNANT NEOPLASM OF CERVIX: ICD-10-CM

## 2022-08-26 PROCEDURE — 87624 HPV HI-RISK TYP POOLED RSLT: CPT | Performed by: OBSTETRICS & GYNECOLOGY

## 2022-08-26 PROCEDURE — G0145 SCR C/V CYTO,THINLAYER,RESCR: HCPCS | Performed by: OBSTETRICS & GYNECOLOGY

## 2022-08-31 LAB
BKR LAB AP GYN ADEQUACY: NORMAL
BKR LAB AP GYN INTERPRETATION: NORMAL
BKR LAB AP HPV REFLEX: NORMAL
BKR LAB AP LMP: NORMAL
BKR LAB AP PREVIOUS ABNL DX: NORMAL
BKR LAB AP PREVIOUS ABNORMAL: NORMAL
PATH REPORT.COMMENTS IMP SPEC: NORMAL
PATH REPORT.COMMENTS IMP SPEC: NORMAL
PATH REPORT.RELEVANT HX SPEC: NORMAL

## 2022-09-02 LAB
HUMAN PAPILLOMA VIRUS 16 DNA: NEGATIVE
HUMAN PAPILLOMA VIRUS 18 DNA: NEGATIVE
HUMAN PAPILLOMA VIRUS FINAL DIAGNOSIS: NORMAL
HUMAN PAPILLOMA VIRUS OTHER HR: NEGATIVE

## 2023-08-21 ENCOUNTER — LAB REQUISITION (OUTPATIENT)
Dept: LAB | Facility: CLINIC | Age: 63
End: 2023-08-21

## 2023-08-21 DIAGNOSIS — Z12.4 ENCOUNTER FOR SCREENING FOR MALIGNANT NEOPLASM OF CERVIX: ICD-10-CM

## 2023-08-21 PROCEDURE — G0145 SCR C/V CYTO,THINLAYER,RESCR: HCPCS | Performed by: OBSTETRICS & GYNECOLOGY

## 2023-08-21 PROCEDURE — 87624 HPV HI-RISK TYP POOLED RSLT: CPT | Performed by: OBSTETRICS & GYNECOLOGY

## 2023-09-01 ENCOUNTER — ANCILLARY PROCEDURE (OUTPATIENT)
Dept: BONE DENSITY | Facility: CLINIC | Age: 63
End: 2023-09-01
Attending: OBSTETRICS & GYNECOLOGY
Payer: COMMERCIAL

## 2023-09-01 DIAGNOSIS — Z13.820 SCREENING FOR OSTEOPOROSIS: ICD-10-CM

## 2023-09-01 PROCEDURE — 77080 DXA BONE DENSITY AXIAL: CPT | Mod: TC | Performed by: PHYSICIAN ASSISTANT

## 2024-10-21 ENCOUNTER — LAB REQUISITION (OUTPATIENT)
Dept: LAB | Facility: CLINIC | Age: 64
End: 2024-10-21

## 2024-10-21 DIAGNOSIS — Z12.4 ENCOUNTER FOR SCREENING FOR MALIGNANT NEOPLASM OF CERVIX: ICD-10-CM

## 2024-10-21 PROCEDURE — G0145 SCR C/V CYTO,THINLAYER,RESCR: HCPCS | Performed by: OBSTETRICS & GYNECOLOGY

## 2024-11-09 ENCOUNTER — HEALTH MAINTENANCE LETTER (OUTPATIENT)
Age: 64
End: 2024-11-09